# Patient Record
Sex: FEMALE | Race: WHITE
[De-identification: names, ages, dates, MRNs, and addresses within clinical notes are randomized per-mention and may not be internally consistent; named-entity substitution may affect disease eponyms.]

---

## 2021-06-15 NOTE — NUR
PT STATES SHE RECEIVED 2ND MODERNA COVID VACC YESTERDAY.  C/O LT UPPER ARM 
PAIN; STARTED AT 1800 YESTERDAY; PAIN W/ MOVEMENT OF LUE.  TOOK "HEADACHE 
RELIEF" ([employer]) YESTERDAY W/ MINIMAL RELIEF.  PT IS RT HANDED.

## 2021-06-15 NOTE — NUR
TASK RN: Patient/Caregiver given discharge instructions and they have confirmed 
that they understand the instructions.  Patient ambulatory with steady gait. 
NAD, all questions answered appropriately, denies additional needs at this 
time. No personal belongings left in room after discharge.

## 2021-07-07 ENCOUNTER — HOSPITAL ENCOUNTER (EMERGENCY)
Dept: HOSPITAL 8 - ED | Age: 31
LOS: 1 days | Discharge: HOME | End: 2021-07-08
Payer: MEDICAID

## 2021-07-07 VITALS — HEIGHT: 61 IN | WEIGHT: 189.16 LBS | BODY MASS INDEX: 35.71 KG/M2

## 2021-07-07 DIAGNOSIS — O20.9: ICD-10-CM

## 2021-07-07 DIAGNOSIS — O21.8: ICD-10-CM

## 2021-07-07 DIAGNOSIS — Z32.01: ICD-10-CM

## 2021-07-07 DIAGNOSIS — Z3A.01: ICD-10-CM

## 2021-07-07 DIAGNOSIS — O26.891: Primary | ICD-10-CM

## 2021-07-07 DIAGNOSIS — R10.30: ICD-10-CM

## 2021-07-07 LAB
ALBUMIN SERPL-MCNC: 3.8 G/DL (ref 3.4–5)
ALP SERPL-CCNC: 110 U/L (ref 45–117)
ALT SERPL-CCNC: 25 U/L (ref 12–78)
ANION GAP SERPL CALC-SCNC: 3 MMOL/L (ref 5–15)
BASOPHILS # BLD AUTO: 0.1 X10^3/UL (ref 0–0.1)
BASOPHILS NFR BLD AUTO: 1 % (ref 0–1)
BILIRUB SERPL-MCNC: 0.4 MG/DL (ref 0.2–1)
CALCIUM SERPL-MCNC: 8.6 MG/DL (ref 8.5–10.1)
CHLORIDE SERPL-SCNC: 108 MMOL/L (ref 98–107)
CREAT SERPL-MCNC: 0.77 MG/DL (ref 0.55–1.02)
EOSINOPHIL # BLD AUTO: 0 X10^3/UL (ref 0–0.4)
EOSINOPHIL NFR BLD AUTO: 0 % (ref 1–7)
ERYTHROCYTE [DISTWIDTH] IN BLOOD BY AUTOMATED COUNT: 13.4 % (ref 9.6–15.2)
LYMPHOCYTES # BLD AUTO: 2.4 X10^3/UL (ref 1–3.4)
LYMPHOCYTES NFR BLD AUTO: 20 % (ref 22–44)
MCH RBC QN AUTO: 29.1 PG (ref 27–34.8)
MCHC RBC AUTO-ENTMCNC: 34.2 G/DL (ref 32.4–35.8)
MONOCYTES # BLD AUTO: 0.5 X10^3/UL (ref 0.2–0.8)
MONOCYTES NFR BLD AUTO: 5 % (ref 2–9)
NEUTROPHILS # BLD AUTO: 8.8 X10^3/UL (ref 1.8–6.8)
NEUTROPHILS NFR BLD AUTO: 74 % (ref 42–75)
PLATELET # BLD AUTO: 188 X10^3/UL (ref 130–400)
PMV BLD AUTO: 7.8 FL (ref 7.4–10.4)
PROT SERPL-MCNC: 7.7 G/DL (ref 6.4–8.2)
RBC # BLD AUTO: 5.3 X10^6/UL (ref 3.82–5.3)

## 2021-07-07 PROCEDURE — 81001 URINALYSIS AUTO W/SCOPE: CPT

## 2021-07-07 PROCEDURE — 84702 CHORIONIC GONADOTROPIN TEST: CPT

## 2021-07-07 PROCEDURE — 80053 COMPREHEN METABOLIC PANEL: CPT

## 2021-07-07 PROCEDURE — 87086 URINE CULTURE/COLONY COUNT: CPT

## 2021-07-07 PROCEDURE — 99285 EMERGENCY DEPT VISIT HI MDM: CPT

## 2021-07-07 PROCEDURE — 96361 HYDRATE IV INFUSION ADD-ON: CPT

## 2021-07-07 PROCEDURE — 84703 CHORIONIC GONADOTROPIN ASSAY: CPT

## 2021-07-07 PROCEDURE — 85025 COMPLETE CBC W/AUTO DIFF WBC: CPT

## 2021-07-07 PROCEDURE — 86901 BLOOD TYPING SEROLOGIC RH(D): CPT

## 2021-07-07 PROCEDURE — 96374 THER/PROPH/DIAG INJ IV PUSH: CPT

## 2021-07-07 PROCEDURE — 36415 COLL VENOUS BLD VENIPUNCTURE: CPT

## 2021-07-07 PROCEDURE — 76830 TRANSVAGINAL US NON-OB: CPT

## 2021-07-07 NOTE — NUR
ASSUMED CARE OF PT. SHE IS HERE C/O MENSTRUAL CRAMPS THAT IS GETTING WORSE. 
STATES SHE IS ON HER MENSTRUAL PERIOD FOR ALMOST A MONTH NOW. + N/V X4 DAYS R/T 
CRAMPS



MED STUDENT BEDSIDE

## 2021-07-08 VITALS — DIASTOLIC BLOOD PRESSURE: 66 MMHG | SYSTOLIC BLOOD PRESSURE: 128 MMHG

## 2021-07-08 LAB — MICROSCOPIC: (no result)

## 2021-07-08 NOTE — NUR
WENT TO PERFORM STRAIGHT CATH TO COLLECT UA, PT REPORTED VAGINAL US WAS PAINFUL 
FOR HER AND WOULD PREFER TO NOT HAVE STRAIGHT CATH. MD STATED CLEAN CATCH WOULD 
BE ACCEPTABLE. PT DENIES NEED TO VOID AT THIS TIME. COLTON HEBERT. CALL LIGHT W/IN 
REACH

## 2021-07-08 NOTE — NUR
PT SITTING ON [facility] ON PHONE, COLTON HEBERT. PT MEDICATED PER MAR. CALL LIGHT W/IN 
REACH.

## 2021-07-08 NOTE — NUR
PT MEDICATED PER MAR. PT PROVIDED SUPPLIES FOR URINE SAMPLE AND GIVEN 
INSTRUCTIONS ON CLEAN CATCH. SHE WAS UP TO THE BATHROOM AND BACK W/O INCIDENT, 
STEADY GAIT. PT ABLE TO PROVIDE URINE SAMPLE. VSS, CALL LIGHT W/IN REACH.

## 2021-07-09 ENCOUNTER — HOSPITAL ENCOUNTER (EMERGENCY)
Dept: HOSPITAL 8 - ED | Age: 31
LOS: 1 days | Discharge: HOME | End: 2021-07-10
Payer: MEDICAID

## 2021-07-09 VITALS — SYSTOLIC BLOOD PRESSURE: 115 MMHG | DIASTOLIC BLOOD PRESSURE: 67 MMHG

## 2021-07-09 VITALS — BODY MASS INDEX: 35.5 KG/M2 | HEIGHT: 61 IN | WEIGHT: 188.05 LBS

## 2021-07-09 DIAGNOSIS — R11.2: ICD-10-CM

## 2021-07-09 DIAGNOSIS — O03.9: Primary | ICD-10-CM

## 2021-07-09 DIAGNOSIS — R10.30: ICD-10-CM

## 2021-07-09 LAB
ALBUMIN SERPL-MCNC: 3.8 G/DL (ref 3.4–5)
ANION GAP SERPL CALC-SCNC: 6 MMOL/L (ref 5–15)
BASOPHILS # BLD AUTO: 0.1 X10^3/UL (ref 0–0.1)
BASOPHILS NFR BLD AUTO: 1 % (ref 0–1)
CALCIUM SERPL-MCNC: 8.7 MG/DL (ref 8.5–10.1)
CHLORIDE SERPL-SCNC: 106 MMOL/L (ref 98–107)
CREAT SERPL-MCNC: 0.63 MG/DL (ref 0.55–1.02)
EOSINOPHIL # BLD AUTO: 0 X10^3/UL (ref 0–0.4)
EOSINOPHIL NFR BLD AUTO: 1 % (ref 1–7)
ERYTHROCYTE [DISTWIDTH] IN BLOOD BY AUTOMATED COUNT: 13.6 % (ref 9.6–15.2)
LYMPHOCYTES # BLD AUTO: 1.9 X10^3/UL (ref 1–3.4)
LYMPHOCYTES NFR BLD AUTO: 20 % (ref 22–44)
MCH RBC QN AUTO: 29.7 PG (ref 27–34.8)
MCHC RBC AUTO-ENTMCNC: 34.6 G/DL (ref 32.4–35.8)
MONOCYTES # BLD AUTO: 0.6 X10^3/UL (ref 0.2–0.8)
MONOCYTES NFR BLD AUTO: 6 % (ref 2–9)
NEUTROPHILS # BLD AUTO: 7 X10^3/UL (ref 1.8–6.8)
NEUTROPHILS NFR BLD AUTO: 73 % (ref 42–75)
PLATELET # BLD AUTO: 178 X10^3/UL (ref 130–400)
PMV BLD AUTO: 7.7 FL (ref 7.4–10.4)
RBC # BLD AUTO: 5.08 X10^6/UL (ref 3.82–5.3)

## 2021-07-09 PROCEDURE — 82040 ASSAY OF SERUM ALBUMIN: CPT

## 2021-07-09 PROCEDURE — 84702 CHORIONIC GONADOTROPIN TEST: CPT

## 2021-07-09 PROCEDURE — 76801 OB US < 14 WKS SINGLE FETUS: CPT

## 2021-07-09 PROCEDURE — 36415 COLL VENOUS BLD VENIPUNCTURE: CPT

## 2021-07-09 PROCEDURE — 99284 EMERGENCY DEPT VISIT MOD MDM: CPT

## 2021-07-09 PROCEDURE — 80048 BASIC METABOLIC PNL TOTAL CA: CPT

## 2021-07-09 PROCEDURE — 85025 COMPLETE CBC W/AUTO DIFF WBC: CPT

## 2021-07-14 ENCOUNTER — HOSPITAL ENCOUNTER (EMERGENCY)
Dept: HOSPITAL 8 - ED | Age: 31
Discharge: HOME | End: 2021-07-14
Payer: MEDICAID

## 2021-07-14 VITALS — SYSTOLIC BLOOD PRESSURE: 134 MMHG | DIASTOLIC BLOOD PRESSURE: 87 MMHG

## 2021-07-14 VITALS — HEIGHT: 61 IN | BODY MASS INDEX: 36.34 KG/M2 | WEIGHT: 192.46 LBS

## 2021-07-14 DIAGNOSIS — O03.9: Primary | ICD-10-CM

## 2021-07-14 PROCEDURE — 96372 THER/PROPH/DIAG INJ SC/IM: CPT

## 2021-07-14 PROCEDURE — 99283 EMERGENCY DEPT VISIT LOW MDM: CPT

## 2021-07-30 ENCOUNTER — HOSPITAL ENCOUNTER (EMERGENCY)
Dept: HOSPITAL 8 - ED | Age: 31
Discharge: HOME | End: 2021-07-30
Payer: MEDICAID

## 2021-07-30 VITALS — BODY MASS INDEX: 33.3 KG/M2 | WEIGHT: 176.37 LBS | HEIGHT: 61 IN

## 2021-07-30 VITALS — DIASTOLIC BLOOD PRESSURE: 62 MMHG | SYSTOLIC BLOOD PRESSURE: 102 MMHG

## 2021-07-30 DIAGNOSIS — R94.31: ICD-10-CM

## 2021-07-30 DIAGNOSIS — R06.02: ICD-10-CM

## 2021-07-30 DIAGNOSIS — R07.89: Primary | ICD-10-CM

## 2021-07-30 LAB
ALBUMIN SERPL-MCNC: 3.6 G/DL (ref 3.4–5)
ANION GAP SERPL CALC-SCNC: 6 MMOL/L (ref 5–15)
BASOPHILS # BLD AUTO: 0.1 X10^3/UL (ref 0–0.1)
BASOPHILS NFR BLD AUTO: 1 % (ref 0–1)
CALCIUM SERPL-MCNC: 8.6 MG/DL (ref 8.5–10.1)
CHLORIDE SERPL-SCNC: 110 MMOL/L (ref 98–107)
CREAT SERPL-MCNC: 0.66 MG/DL (ref 0.55–1.02)
EOSINOPHIL # BLD AUTO: 0 X10^3/UL (ref 0–0.4)
EOSINOPHIL NFR BLD AUTO: 0 % (ref 1–7)
ERYTHROCYTE [DISTWIDTH] IN BLOOD BY AUTOMATED COUNT: 13.6 % (ref 9.6–15.2)
LYMPHOCYTES # BLD AUTO: 2 X10^3/UL (ref 1–3.4)
LYMPHOCYTES NFR BLD AUTO: 22 % (ref 22–44)
MCH RBC QN AUTO: 29.7 PG (ref 27–34.8)
MCHC RBC AUTO-ENTMCNC: 34.8 G/DL (ref 32.4–35.8)
MONOCYTES # BLD AUTO: 0.5 X10^3/UL (ref 0.2–0.8)
MONOCYTES NFR BLD AUTO: 6 % (ref 2–9)
NEUTROPHILS # BLD AUTO: 6.7 X10^3/UL (ref 1.8–6.8)
NEUTROPHILS NFR BLD AUTO: 71 % (ref 42–75)
PLATELET # BLD AUTO: 235 X10^3/UL (ref 130–400)
PMV BLD AUTO: 7.8 FL (ref 7.4–10.4)
RBC # BLD AUTO: 4.99 X10^6/UL (ref 3.82–5.3)

## 2021-07-30 PROCEDURE — 71045 X-RAY EXAM CHEST 1 VIEW: CPT

## 2021-07-30 PROCEDURE — 93005 ELECTROCARDIOGRAM TRACING: CPT

## 2021-07-30 PROCEDURE — 85025 COMPLETE CBC W/AUTO DIFF WBC: CPT

## 2021-07-30 PROCEDURE — 80048 BASIC METABOLIC PNL TOTAL CA: CPT

## 2021-07-30 PROCEDURE — 99285 EMERGENCY DEPT VISIT HI MDM: CPT

## 2021-07-30 PROCEDURE — 84703 CHORIONIC GONADOTROPIN ASSAY: CPT

## 2021-07-30 PROCEDURE — 36415 COLL VENOUS BLD VENIPUNCTURE: CPT

## 2021-07-30 PROCEDURE — 82040 ASSAY OF SERUM ALBUMIN: CPT

## 2021-07-30 NOTE — NUR
pt bib ems from work. 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 
pain that started at 1730. pt seen by jobs emt's and was told to go back to 
work but pain was ot getting better. pt recieved 100 mcg fentynal and 4 mg 
zofran enroute. Pt denies pain or nausea at this time. pt on all monitors, labs 
drawn, cxr complete, and ekg done.